# Patient Record
Sex: FEMALE | Race: BLACK OR AFRICAN AMERICAN | URBAN - METROPOLITAN AREA
[De-identification: names, ages, dates, MRNs, and addresses within clinical notes are randomized per-mention and may not be internally consistent; named-entity substitution may affect disease eponyms.]

---

## 2021-02-18 ENCOUNTER — OFFICE VISIT (OUTPATIENT)
Dept: URBAN - METROPOLITAN AREA CLINIC 17 | Facility: CLINIC | Age: 41
End: 2021-02-18
Payer: COMMERCIAL

## 2021-02-18 DIAGNOSIS — Z80.0 FAMILY HISTORY OF COLON CANCER: ICD-10-CM

## 2021-02-18 DIAGNOSIS — Z15.09 LYNCH SYNDROME: ICD-10-CM

## 2021-02-18 DIAGNOSIS — R19.4 CHANGE IN BOWEL HABIT: ICD-10-CM

## 2021-02-18 PROCEDURE — 99203 OFFICE O/P NEW LOW 30 MIN: CPT | Performed by: INTERNAL MEDICINE

## 2021-02-18 NOTE — HPI-OTHER HISTORIES
This is a 39 yo female here to schedule her first colonoscopy.  Her sister passed away from colon cancer at age 52.  Her brother was diagnossed with  stomach cancer with stomach cancer at age 48.   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    For the past 6 months stool frequency has decreased and she also reports right sided discomfort.  She denies nausea, vomiting and weight loss.

## 2021-04-09 ENCOUNTER — TELEPHONE ENCOUNTER (OUTPATIENT)
Dept: URBAN - METROPOLITAN AREA CLINIC 17 | Facility: CLINIC | Age: 41
End: 2021-04-09

## 2021-04-20 ENCOUNTER — CLAIMS CREATED FROM THE CLAIM WINDOW (OUTPATIENT)
Dept: URBAN - METROPOLITAN AREA CLINIC 4 | Facility: CLINIC | Age: 41
End: 2021-04-20
Payer: COMMERCIAL

## 2021-04-20 ENCOUNTER — OFFICE VISIT (OUTPATIENT)
Dept: URBAN - METROPOLITAN AREA SURGERY CENTER 16 | Facility: SURGERY CENTER | Age: 41
End: 2021-04-20
Payer: COMMERCIAL

## 2021-04-20 DIAGNOSIS — R19.4 ALTERED BOWEL HABITS: ICD-10-CM

## 2021-04-20 DIAGNOSIS — K31.89 MASS OF DUODENUM: ICD-10-CM

## 2021-04-20 DIAGNOSIS — Z80.0 FAMILY HISTORY MALIGNANT NEOPLASM OF BILIARY TRACT: ICD-10-CM

## 2021-04-20 PROCEDURE — 45378 DIAGNOSTIC COLONOSCOPY: CPT | Performed by: INTERNAL MEDICINE

## 2021-04-20 PROCEDURE — G8907 PT DOC NO EVENTS ON DISCHARG: HCPCS | Performed by: INTERNAL MEDICINE

## 2021-04-20 PROCEDURE — 88312 SPECIAL STAINS GROUP 1: CPT | Performed by: PATHOLOGY

## 2021-04-20 PROCEDURE — 88305 TISSUE EXAM BY PATHOLOGIST: CPT | Performed by: PATHOLOGY

## 2021-04-20 PROCEDURE — 43239 EGD BIOPSY SINGLE/MULTIPLE: CPT | Performed by: INTERNAL MEDICINE

## 2022-08-23 ENCOUNTER — OFFICE VISIT (OUTPATIENT)
Dept: URBAN - METROPOLITAN AREA CLINIC 17 | Facility: CLINIC | Age: 42
End: 2022-08-23
Payer: COMMERCIAL

## 2022-08-23 ENCOUNTER — DASHBOARD ENCOUNTERS (OUTPATIENT)
Age: 42
End: 2022-08-23

## 2022-08-23 ENCOUNTER — LAB OUTSIDE AN ENCOUNTER (OUTPATIENT)
Dept: URBAN - METROPOLITAN AREA CLINIC 17 | Facility: CLINIC | Age: 42
End: 2022-08-23

## 2022-08-23 ENCOUNTER — WEB ENCOUNTER (OUTPATIENT)
Dept: URBAN - METROPOLITAN AREA CLINIC 17 | Facility: CLINIC | Age: 42
End: 2022-08-23

## 2022-08-23 VITALS
HEART RATE: 76 BPM | WEIGHT: 215.2 LBS | SYSTOLIC BLOOD PRESSURE: 147 MMHG | BODY MASS INDEX: 32.61 KG/M2 | HEIGHT: 68 IN | DIASTOLIC BLOOD PRESSURE: 93 MMHG | TEMPERATURE: 97.3 F

## 2022-08-23 DIAGNOSIS — R11.11 VOMITING WITHOUT NAUSEA, UNSPECIFIED VOMITING TYPE: ICD-10-CM

## 2022-08-23 DIAGNOSIS — R19.7 DIARRHEA, UNSPECIFIED TYPE: ICD-10-CM

## 2022-08-23 DIAGNOSIS — Z80.0 FAMILY HISTORY OF COLON CANCER: ICD-10-CM

## 2022-08-23 PROCEDURE — 99214 OFFICE O/P EST MOD 30 MIN: CPT | Performed by: INTERNAL MEDICINE

## 2022-08-23 NOTE — HPI-OTHER HISTORIES
(Feb 2021) This is a 41 yo female here to schedule her first colonoscopy.  Her sister passed away from colon cancer at age 52.  Her brother was diagnossed with  stomach cancer with stomach cancer at age 48.   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    For the past 6 months stool frequency has decreased and she also reports right sided discomfort.  She denies nausea, vomiting and weight loss.

## 2022-08-23 NOTE — HPI-TODAY'S VISIT:
This is a 42 yo female here  complaining of GI issues.  An EGD and colonoscopy 2021 were unremarkable.  For the past few months she has been having violent episodes of vomiting.  The first episode was May, 2nd June and the 3rd in July.   Each time it is in the middle of the night.  Vomiting and loose stools lasts about 40 minutes and occurs with each episode.  Cramping precedes each BM and is relieved with vomiting and diarrhea.  She denies weight loss.  She denies nausea, vomiting, abdominal pain between these episodes.  She admits anxiety and stress over the past 2 years which worsened a few months ago.  She takes multiple herbal product for the past year.

## 2022-08-24 LAB
A/G RATIO: 1.3
ALBUMIN: 3.7
ALKALINE PHOSPHATASE: 51
ALT (SGPT): 15
AST (SGOT): 21
BILIRUBIN, TOTAL: 0.7
BUN/CREATININE RATIO: (no result)
BUN: 10
CALCIUM: 9
CARBON DIOXIDE, TOTAL: 29
CEA: 0.8
CHLORIDE: 101
CREATININE: 0.8
EGFR: 94
FOLATE (FOLIC ACID), SERUM: 18.5
GLOBULIN, TOTAL: 2.9
GLUCOSE: 77
HEMATOCRIT: 42.4
HEMOGLOBIN: 15
MCH: 31.1
MCHC: 35.4
MCV: 88
MPV: 10.6
PLATELET COUNT: 316
POTASSIUM: 4.4
PROTEIN, TOTAL: 6.6
RDW: 12.2
RED BLOOD CELL COUNT: 4.82
SODIUM: 139
VITAMIN B12: 465
VITAMIN D,25-OH,TOTAL,IA: 34
WHITE BLOOD CELL COUNT: 5.2

## 2022-09-15 ENCOUNTER — CLAIMS CREATED FROM THE CLAIM WINDOW (OUTPATIENT)
Dept: URBAN - METROPOLITAN AREA CLINIC 4 | Facility: CLINIC | Age: 42
End: 2022-09-15
Payer: COMMERCIAL

## 2022-09-15 ENCOUNTER — OFFICE VISIT (OUTPATIENT)
Dept: URBAN - METROPOLITAN AREA SURGERY CENTER 16 | Facility: SURGERY CENTER | Age: 42
End: 2022-09-15
Payer: COMMERCIAL

## 2022-09-15 DIAGNOSIS — K21.9 ACID REFLUX: ICD-10-CM

## 2022-09-15 DIAGNOSIS — K21.9 GASTRO-ESOPHAGEAL REFLUX DISEASE WITHOUT ESOPHAGITIS: ICD-10-CM

## 2022-09-15 PROCEDURE — G8907 PT DOC NO EVENTS ON DISCHARG: HCPCS | Performed by: INTERNAL MEDICINE

## 2022-09-15 PROCEDURE — 43239 EGD BIOPSY SINGLE/MULTIPLE: CPT | Performed by: INTERNAL MEDICINE

## 2022-09-15 PROCEDURE — 88305 TISSUE EXAM BY PATHOLOGIST: CPT | Performed by: PATHOLOGY

## 2023-09-22 ENCOUNTER — WEB ENCOUNTER (OUTPATIENT)
Dept: URBAN - METROPOLITAN AREA CLINIC 17 | Facility: CLINIC | Age: 43
End: 2023-09-22

## 2024-10-10 ENCOUNTER — OFFICE VISIT (OUTPATIENT)
Dept: URBAN - NONMETROPOLITAN AREA CLINIC 4 | Facility: CLINIC | Age: 44
End: 2024-10-10

## 2024-10-31 ENCOUNTER — LAB OUTSIDE AN ENCOUNTER (OUTPATIENT)
Dept: URBAN - NONMETROPOLITAN AREA CLINIC 4 | Facility: CLINIC | Age: 44
End: 2024-10-31

## 2024-10-31 ENCOUNTER — OFFICE VISIT (OUTPATIENT)
Dept: URBAN - NONMETROPOLITAN AREA CLINIC 4 | Facility: CLINIC | Age: 44
End: 2024-10-31

## 2024-10-31 VITALS — HEIGHT: 68 IN

## 2024-10-31 NOTE — HPI-OTHER HISTORIES
(Feb 2021) This is a 39 yo female here to schedule her first colonoscopy.  Her sister passed away from colon cancer at age 52.  Her brother was diagnossed with  stomach cancer with stomach cancer at age 48.   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    For the past 6 months stool frequency has decreased and she also reports right sided discomfort.  She denies nausea, vomiting and weight loss.

## 2024-10-31 NOTE — HPI-TODAY'S VISIT:
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 Fabiola Swift  Had hysterectomy- preventive as well  Reproductive GYN neoplasms have been expressed in family Pt rpeorts that she is here to discuss her recommended intervals for surveillance. She was concerned about the 5 year jenniffer since her families neoplasms began in their 40's.  Last C scope was reviewed with Dr. Dickens in 2021 and was negatave. Maternal aunt with endometrial cancer.

## 2025-01-10 ENCOUNTER — OFFICE VISIT (OUTPATIENT)
Dept: URBAN - METROPOLITAN AREA SURGERY CENTER 14 | Facility: SURGERY CENTER | Age: 45
End: 2025-01-10

## 2025-01-15 ENCOUNTER — OFFICE VISIT (OUTPATIENT)
Dept: URBAN - METROPOLITAN AREA SURGERY CENTER 14 | Facility: SURGERY CENTER | Age: 45
End: 2025-01-15

## 2025-01-29 ENCOUNTER — OFFICE VISIT (OUTPATIENT)
Dept: URBAN - METROPOLITAN AREA SURGERY CENTER 14 | Facility: SURGERY CENTER | Age: 45
End: 2025-01-29

## 2025-02-04 ENCOUNTER — OFFICE VISIT (OUTPATIENT)
Dept: URBAN - NONMETROPOLITAN AREA CLINIC 4 | Facility: CLINIC | Age: 45
End: 2025-02-04

## 2025-02-19 ENCOUNTER — OFFICE VISIT (OUTPATIENT)
Dept: URBAN - NONMETROPOLITAN AREA CLINIC 4 | Facility: CLINIC | Age: 45
End: 2025-02-19